# Patient Record
Sex: FEMALE | Race: WHITE | NOT HISPANIC OR LATINO
[De-identification: names, ages, dates, MRNs, and addresses within clinical notes are randomized per-mention and may not be internally consistent; named-entity substitution may affect disease eponyms.]

---

## 2018-08-30 ENCOUNTER — RESULT REVIEW (OUTPATIENT)
Age: 23
End: 2018-08-30

## 2018-09-17 ENCOUNTER — ASOB RESULT (OUTPATIENT)
Age: 23
End: 2018-09-17

## 2018-09-17 ENCOUNTER — APPOINTMENT (OUTPATIENT)
Dept: ANTEPARTUM | Facility: CLINIC | Age: 23
End: 2018-09-17
Payer: COMMERCIAL

## 2018-09-17 PROCEDURE — 76811 OB US DETAILED SNGL FETUS: CPT

## 2018-12-06 ENCOUNTER — APPOINTMENT (OUTPATIENT)
Dept: ANTEPARTUM | Facility: CLINIC | Age: 23
End: 2018-12-06

## 2018-12-13 ENCOUNTER — APPOINTMENT (OUTPATIENT)
Dept: ANTEPARTUM | Facility: CLINIC | Age: 23
End: 2018-12-13
Payer: COMMERCIAL

## 2018-12-13 ENCOUNTER — ASOB RESULT (OUTPATIENT)
Age: 23
End: 2018-12-13

## 2018-12-13 PROCEDURE — 76816 OB US FOLLOW-UP PER FETUS: CPT

## 2019-01-10 ENCOUNTER — OUTPATIENT (OUTPATIENT)
Dept: OUTPATIENT SERVICES | Facility: HOSPITAL | Age: 24
LOS: 1 days | End: 2019-01-10
Payer: COMMERCIAL

## 2019-01-10 DIAGNOSIS — O26.899 OTHER SPECIFIED PREGNANCY RELATED CONDITIONS, UNSPECIFIED TRIMESTER: ICD-10-CM

## 2019-01-10 DIAGNOSIS — Z3A.00 WEEKS OF GESTATION OF PREGNANCY NOT SPECIFIED: ICD-10-CM

## 2019-01-10 PROCEDURE — 59025 FETAL NON-STRESS TEST: CPT | Mod: 26

## 2019-01-12 ENCOUNTER — OUTPATIENT (OUTPATIENT)
Dept: OUTPATIENT SERVICES | Facility: HOSPITAL | Age: 24
LOS: 1 days | End: 2019-01-12
Payer: COMMERCIAL

## 2019-01-12 DIAGNOSIS — O26.899 OTHER SPECIFIED PREGNANCY RELATED CONDITIONS, UNSPECIFIED TRIMESTER: ICD-10-CM

## 2019-01-12 DIAGNOSIS — Z3A.00 WEEKS OF GESTATION OF PREGNANCY NOT SPECIFIED: ICD-10-CM

## 2019-01-12 PROCEDURE — 59025 FETAL NON-STRESS TEST: CPT | Mod: 26

## 2019-01-12 PROCEDURE — 99215 OFFICE O/P EST HI 40 MIN: CPT | Mod: 25

## 2019-01-12 PROCEDURE — 76818 FETAL BIOPHYS PROFILE W/NST: CPT | Mod: 26

## 2019-01-17 ENCOUNTER — INPATIENT (INPATIENT)
Facility: HOSPITAL | Age: 24
LOS: 1 days | Discharge: HOME | End: 2019-01-19
Attending: OBSTETRICS & GYNECOLOGY | Admitting: OBSTETRICS & GYNECOLOGY
Payer: COMMERCIAL

## 2019-01-17 ENCOUNTER — OUTPATIENT (OUTPATIENT)
Dept: OUTPATIENT SERVICES | Facility: HOSPITAL | Age: 24
LOS: 1 days | End: 2019-01-17
Payer: COMMERCIAL

## 2019-01-17 VITALS — WEIGHT: 191.8 LBS | HEIGHT: 62.2 IN

## 2019-01-17 DIAGNOSIS — Z3A.00 WEEKS OF GESTATION OF PREGNANCY NOT SPECIFIED: ICD-10-CM

## 2019-01-17 DIAGNOSIS — O26.899 OTHER SPECIFIED PREGNANCY RELATED CONDITIONS, UNSPECIFIED TRIMESTER: ICD-10-CM

## 2019-01-17 LAB
BASOPHILS # BLD AUTO: 0.04 K/UL — SIGNIFICANT CHANGE UP (ref 0–0.2)
BASOPHILS NFR BLD AUTO: 0.3 % — SIGNIFICANT CHANGE UP (ref 0–2)
BLD GP AB SCN SERPL QL: NEGATIVE — SIGNIFICANT CHANGE UP
EOSINOPHIL # BLD AUTO: 0.01 K/UL — SIGNIFICANT CHANGE UP (ref 0–0.5)
EOSINOPHIL NFR BLD AUTO: 0.1 % — SIGNIFICANT CHANGE UP (ref 0–6)
HCT VFR BLD CALC: 40.2 % — SIGNIFICANT CHANGE UP (ref 34.5–45)
HGB BLD-MCNC: 14 G/DL — SIGNIFICANT CHANGE UP (ref 11.5–15.5)
HIV COMBO RESULT: SIGNIFICANT CHANGE UP
HIV1+2 AB SPEC QL: SIGNIFICANT CHANGE UP
IMM GRANULOCYTES NFR BLD AUTO: 1 % — SIGNIFICANT CHANGE UP (ref 0–1.5)
LYMPHOCYTES # BLD AUTO: 1.68 K/UL — SIGNIFICANT CHANGE UP (ref 1–3.3)
LYMPHOCYTES # BLD AUTO: 12.4 % — LOW (ref 13–44)
MCHC RBC-ENTMCNC: 32.1 PG — SIGNIFICANT CHANGE UP (ref 27–34)
MCHC RBC-ENTMCNC: 34.8 % — SIGNIFICANT CHANGE UP (ref 32–36)
MCV RBC AUTO: 92.2 FL — SIGNIFICANT CHANGE UP (ref 80–100)
MONOCYTES # BLD AUTO: 0.61 K/UL — SIGNIFICANT CHANGE UP (ref 0–0.9)
MONOCYTES NFR BLD AUTO: 4.5 % — SIGNIFICANT CHANGE UP (ref 2–14)
NEUTROPHILS # BLD AUTO: 11.05 K/UL — HIGH (ref 1.8–7.4)
NEUTROPHILS NFR BLD AUTO: 81.7 % — HIGH (ref 43–77)
NRBC # FLD: 0 K/UL — LOW (ref 25–125)
PLATELET # BLD AUTO: 272 K/UL — SIGNIFICANT CHANGE UP (ref 150–400)
PMV BLD: 10.2 FL — SIGNIFICANT CHANGE UP (ref 7–13)
RBC # BLD: 4.36 M/UL — SIGNIFICANT CHANGE UP (ref 3.8–5.2)
RBC # FLD: 12.5 % — SIGNIFICANT CHANGE UP (ref 10.3–14.5)
RH IG SCN BLD-IMP: POSITIVE — SIGNIFICANT CHANGE UP
WBC # BLD: 13.53 K/UL — HIGH (ref 3.8–10.5)
WBC # FLD AUTO: 13.53 K/UL — HIGH (ref 3.8–10.5)

## 2019-01-17 PROCEDURE — 99212 OFFICE O/P EST SF 10 MIN: CPT

## 2019-01-17 PROCEDURE — 59409 OBSTETRICAL CARE: CPT

## 2019-01-17 RX ORDER — SIMETHICONE 80 MG/1
80 TABLET, CHEWABLE ORAL EVERY 6 HOURS
Qty: 0 | Refills: 0 | Status: DISCONTINUED | OUTPATIENT
Start: 2019-01-18 | End: 2019-01-19

## 2019-01-17 RX ORDER — DIPHENHYDRAMINE HCL 50 MG
25 CAPSULE ORAL EVERY 6 HOURS
Qty: 0 | Refills: 0 | Status: DISCONTINUED | OUTPATIENT
Start: 2019-01-18 | End: 2019-01-19

## 2019-01-17 RX ORDER — SODIUM CHLORIDE 9 MG/ML
3 INJECTION INTRAMUSCULAR; INTRAVENOUS; SUBCUTANEOUS EVERY 8 HOURS
Qty: 0 | Refills: 0 | Status: DISCONTINUED | OUTPATIENT
Start: 2019-01-18 | End: 2019-01-19

## 2019-01-17 RX ORDER — PRAMOXINE HYDROCHLORIDE 150 MG/15G
1 AEROSOL, FOAM RECTAL EVERY 4 HOURS
Qty: 0 | Refills: 0 | Status: DISCONTINUED | OUTPATIENT
Start: 2019-01-17 | End: 2019-01-18

## 2019-01-17 RX ORDER — NORETHINDRONE 0.35 MG/1
1 TABLET ORAL
Qty: 30 | Refills: 2 | OUTPATIENT
Start: 2019-01-17 | End: 2019-04-16

## 2019-01-17 RX ORDER — IBUPROFEN 200 MG
600 TABLET ORAL EVERY 6 HOURS
Qty: 0 | Refills: 0 | Status: COMPLETED | OUTPATIENT
Start: 2019-01-17 | End: 2019-12-16

## 2019-01-17 RX ORDER — KETOROLAC TROMETHAMINE 30 MG/ML
30 SYRINGE (ML) INJECTION ONCE
Qty: 0 | Refills: 0 | Status: DISCONTINUED | OUTPATIENT
Start: 2019-01-17 | End: 2019-01-19

## 2019-01-17 RX ORDER — ACETAMINOPHEN 500 MG
975 TABLET ORAL EVERY 6 HOURS
Qty: 0 | Refills: 0 | Status: COMPLETED | OUTPATIENT
Start: 2019-01-17 | End: 2019-12-16

## 2019-01-17 RX ORDER — AER TRAVELER 0.5 G/1
1 SOLUTION RECTAL; TOPICAL EVERY 4 HOURS
Qty: 0 | Refills: 0 | Status: DISCONTINUED | OUTPATIENT
Start: 2019-01-18 | End: 2019-01-19

## 2019-01-17 RX ORDER — OXYTOCIN 10 UNIT/ML
333.33 VIAL (ML) INJECTION
Qty: 20 | Refills: 0 | Status: COMPLETED | OUTPATIENT
Start: 2019-01-17 | End: 2019-01-17

## 2019-01-17 RX ORDER — AER TRAVELER 0.5 G/1
1 SOLUTION RECTAL; TOPICAL EVERY 4 HOURS
Qty: 0 | Refills: 0 | Status: DISCONTINUED | OUTPATIENT
Start: 2019-01-17 | End: 2019-01-18

## 2019-01-17 RX ORDER — SODIUM CHLORIDE 9 MG/ML
1000 INJECTION, SOLUTION INTRAVENOUS
Qty: 0 | Refills: 0 | Status: DISCONTINUED | OUTPATIENT
Start: 2019-01-17 | End: 2019-01-17

## 2019-01-17 RX ORDER — HYDROCORTISONE 1 %
1 OINTMENT (GRAM) TOPICAL EVERY 4 HOURS
Qty: 0 | Refills: 0 | Status: DISCONTINUED | OUTPATIENT
Start: 2019-01-17 | End: 2019-01-18

## 2019-01-17 RX ORDER — OXYTOCIN 10 UNIT/ML
333.33 VIAL (ML) INJECTION
Qty: 20 | Refills: 0 | Status: COMPLETED | OUTPATIENT
Start: 2019-01-17

## 2019-01-17 RX ORDER — LANOLIN
1 OINTMENT (GRAM) TOPICAL EVERY 6 HOURS
Qty: 0 | Refills: 0 | Status: DISCONTINUED | OUTPATIENT
Start: 2019-01-18 | End: 2019-01-19

## 2019-01-17 RX ORDER — GLYCERIN ADULT
1 SUPPOSITORY, RECTAL RECTAL AT BEDTIME
Qty: 0 | Refills: 0 | Status: DISCONTINUED | OUTPATIENT
Start: 2019-01-18 | End: 2019-01-19

## 2019-01-17 RX ORDER — SODIUM CHLORIDE 9 MG/ML
1000 INJECTION, SOLUTION INTRAVENOUS ONCE
Qty: 0 | Refills: 0 | Status: COMPLETED | OUTPATIENT
Start: 2019-01-17 | End: 2019-01-17

## 2019-01-17 RX ORDER — OXYCODONE HYDROCHLORIDE 5 MG/1
5 TABLET ORAL EVERY 4 HOURS
Qty: 0 | Refills: 0 | Status: DISCONTINUED | OUTPATIENT
Start: 2019-01-17 | End: 2019-01-19

## 2019-01-17 RX ORDER — MAGNESIUM HYDROXIDE 400 MG/1
30 TABLET, CHEWABLE ORAL
Qty: 0 | Refills: 0 | Status: DISCONTINUED | OUTPATIENT
Start: 2019-01-18 | End: 2019-01-19

## 2019-01-17 RX ORDER — DIBUCAINE 1 %
1 OINTMENT (GRAM) RECTAL EVERY 4 HOURS
Qty: 0 | Refills: 0 | Status: DISCONTINUED | OUTPATIENT
Start: 2019-01-17 | End: 2019-01-18

## 2019-01-17 RX ORDER — TETANUS TOXOID, REDUCED DIPHTHERIA TOXOID AND ACELLULAR PERTUSSIS VACCINE, ADSORBED 5; 2.5; 8; 8; 2.5 [IU]/.5ML; [IU]/.5ML; UG/.5ML; UG/.5ML; UG/.5ML
0.5 SUSPENSION INTRAMUSCULAR ONCE
Qty: 0 | Refills: 0 | Status: DISCONTINUED | OUTPATIENT
Start: 2019-01-18 | End: 2019-01-19

## 2019-01-17 RX ORDER — OXYTOCIN 10 UNIT/ML
41.67 VIAL (ML) INJECTION
Qty: 20 | Refills: 0 | Status: DISCONTINUED | OUTPATIENT
Start: 2019-01-17 | End: 2019-01-18

## 2019-01-17 RX ORDER — DIBUCAINE 1 %
1 OINTMENT (GRAM) RECTAL EVERY 4 HOURS
Qty: 0 | Refills: 0 | Status: DISCONTINUED | OUTPATIENT
Start: 2019-01-18 | End: 2019-01-19

## 2019-01-17 RX ORDER — SODIUM CHLORIDE 9 MG/ML
3 INJECTION INTRAMUSCULAR; INTRAVENOUS; SUBCUTANEOUS EVERY 8 HOURS
Qty: 0 | Refills: 0 | Status: DISCONTINUED | OUTPATIENT
Start: 2019-01-17 | End: 2019-01-18

## 2019-01-17 RX ORDER — OXYCODONE HYDROCHLORIDE 5 MG/1
5 TABLET ORAL
Qty: 0 | Refills: 0 | Status: DISCONTINUED | OUTPATIENT
Start: 2019-01-17 | End: 2019-01-19

## 2019-01-17 RX ORDER — DOCUSATE SODIUM 100 MG
100 CAPSULE ORAL
Qty: 0 | Refills: 0 | Status: DISCONTINUED | OUTPATIENT
Start: 2019-01-18 | End: 2019-01-19

## 2019-01-17 RX ADMIN — SODIUM CHLORIDE 2000 MILLILITER(S): 9 INJECTION, SOLUTION INTRAVENOUS at 18:36

## 2019-01-17 RX ADMIN — SODIUM CHLORIDE 250 MILLILITER(S): 9 INJECTION, SOLUTION INTRAVENOUS at 17:35

## 2019-01-17 RX ADMIN — Medication 125 MILLIUNIT(S)/MIN: at 22:55

## 2019-01-17 RX ADMIN — Medication 1000 MILLIUNIT(S)/MIN: at 22:55

## 2019-01-17 NOTE — PROVIDER CONTACT NOTE (OTHER) - ASSESSMENT
HR elevated to 120s, BP WNL, shock index 1.1, patient declines dizziness/lightheadedness, bleeding moderate - uterus firm and at umbilicus

## 2019-01-17 NOTE — PROVIDER CONTACT NOTE (OTHER) - BACKGROUND
Patient was slighty tachycardic during labor up to 110s, patient has been afebrile, oral temp of 36.9 at 2240

## 2019-01-18 ENCOUNTER — TRANSCRIPTION ENCOUNTER (OUTPATIENT)
Age: 24
End: 2019-01-18

## 2019-01-18 LAB — T PALLIDUM AB TITR SER: NEGATIVE — SIGNIFICANT CHANGE UP

## 2019-01-18 RX ORDER — IBUPROFEN 200 MG
600 TABLET ORAL EVERY 6 HOURS
Qty: 0 | Refills: 0 | Status: DISCONTINUED | OUTPATIENT
Start: 2019-01-18 | End: 2019-01-19

## 2019-01-18 RX ORDER — HYDROCORTISONE 1 %
1 OINTMENT (GRAM) TOPICAL EVERY 4 HOURS
Qty: 0 | Refills: 0 | Status: DISCONTINUED | OUTPATIENT
Start: 2019-01-18 | End: 2019-01-19

## 2019-01-18 RX ORDER — ACETAMINOPHEN 500 MG
975 TABLET ORAL EVERY 6 HOURS
Qty: 0 | Refills: 0 | Status: DISCONTINUED | OUTPATIENT
Start: 2019-01-18 | End: 2019-01-19

## 2019-01-18 RX ORDER — HYDROCORTISONE 1 %
1 OINTMENT (GRAM) TOPICAL EVERY 4 HOURS
Qty: 0 | Refills: 0 | Status: DISCONTINUED | OUTPATIENT
Start: 2019-01-18 | End: 2019-01-18

## 2019-01-18 RX ORDER — OXYTOCIN 10 UNIT/ML
41.67 VIAL (ML) INJECTION
Qty: 20 | Refills: 0 | Status: DISCONTINUED | OUTPATIENT
Start: 2019-01-18 | End: 2019-01-18

## 2019-01-18 RX ORDER — PRAMOXINE HYDROCHLORIDE 150 MG/15G
1 AEROSOL, FOAM RECTAL EVERY 4 HOURS
Qty: 0 | Refills: 0 | Status: DISCONTINUED | OUTPATIENT
Start: 2019-01-18 | End: 2019-01-19

## 2019-01-18 RX ORDER — PRAMOXINE HYDROCHLORIDE 150 MG/15G
1 AEROSOL, FOAM RECTAL EVERY 4 HOURS
Qty: 0 | Refills: 0 | Status: DISCONTINUED | OUTPATIENT
Start: 2019-01-18 | End: 2019-01-18

## 2019-01-18 RX ADMIN — SODIUM CHLORIDE 3 MILLILITER(S): 9 INJECTION INTRAMUSCULAR; INTRAVENOUS; SUBCUTANEOUS at 14:00

## 2019-01-18 RX ADMIN — Medication 1 TABLET(S): at 11:09

## 2019-01-18 RX ADMIN — SODIUM CHLORIDE 3 MILLILITER(S): 9 INJECTION INTRAMUSCULAR; INTRAVENOUS; SUBCUTANEOUS at 05:47

## 2019-01-18 NOTE — PROGRESS NOTE ADULT - SUBJECTIVE AND OBJECTIVE BOX
Post-op epidural followup:    pt states freely voiding and ambulating.  Denies headache/nausea.  States no concerns at this time.  Vital signs stable.    ICU Vital Signs Last 24 Hrs  T(C): 36.4 (18 Jan 2019 10:18), Max: 36.9 (17 Jan 2019 22:35)  T(F): 97.6 (18 Jan 2019 10:18), Max: 98.4 (17 Jan 2019 22:35)  HR: 106 (18 Jan 2019 10:18) (106 - 115)  BP: 112/79 (18 Jan 2019 10:18) (98/61 - 131/68)  BP(mean): --  ABP: --  ABP(mean): --  RR: 18 (18 Jan 2019 10:18) (16 - 18)  SpO2: 100% (18 Jan 2019 10:18) (98% - 100%)

## 2019-01-18 NOTE — DISCHARGE NOTE OB - ADDITIONAL INSTRUCTIONS
Please call for  follow up  postpartum visit within 4-6  weeks of delivery date,  at Ambulatory Clinic Unit : [facility] :  [facility], 3rd floor : phone # [phone] or walk-in hours are: MONDAY 3-6 pm, WEDNESDAY 3-6 pm, FRIDAY 9-11 am, 1-3 pm

## 2019-01-18 NOTE — DISCHARGE NOTE OB - MEDICATION SUMMARY - MEDICATIONS TO TAKE
I will START or STAY ON the medications listed below when I get home from the hospital:    acetaminophen 325 mg oral tablet  -- 3 tab(s) by mouth every 6 hours  -- Indication: For pain control    ibuprofen 600 mg oral tablet  -- 1 tab(s) by mouth every 6 hours  -- Indication: For pain control    Prenatal Multivitamins with Folic Acid 1 mg oral tablet  -- 1 tab(s) by mouth once a day  -- Indication: For breastfeeding    norethindrone 0.35 mg oral tablet  -- 1 tab(s) by mouth once a day MDD:1  -- Do not take this drug if you are pregnant.  It is very important that you take or use this exactly as directed.  Do not skip doses or discontinue unless directed by your doctor.    -- Indication: For birth control

## 2019-01-18 NOTE — DISCHARGE NOTE OB - PLAN OF CARE
routine recovery Make your follow-up appointment with your doctor in 6 weeks. No heavy lifting, driving, or strenuous activity for 6 weeks. Nothing per vagina such as tampons, intercourse, douches, or tub baths for 6 weeks or until you see your doctor. Call your doctor with any signs and symptoms of infection such as fever, chills, nausea, or vomiting. Call your doctor if you're unable to tolerate food, increase in vaginal bleeding, or have difficulty urinating. Call your doctor if you have pain that is not relieved by your prescribed medications. Notify your doctor with any other concerns.   Call Dr. Hogue' office if you have any of these concerns in the next 6 weeks.

## 2019-01-18 NOTE — DISCHARGE NOTE OB - MEDICATION SUMMARY - MEDICATIONS TO STOP TAKING
I will STOP taking the medications listed below when I get home from the hospital:    Prenatal Vitamins  -- 1 tab(s) by mouth once a day    acetaminophen 325 mg oral tablet  -- 3 tab(s) by mouth every 6 hours    ibuprofen 600 mg oral tablet  -- 1 tab(s) by mouth every 6 hours    dibucaine 1% topical ointment  -- 1 application on skin every 4 hours, As needed, Perineal Discomfort

## 2019-01-18 NOTE — DISCHARGE NOTE OB - HOSPITAL COURSE
Patient had an uncomplicated  followed by an uncomplicated postpartum course. , Hct 40.2 On postpartum day 2, patient was discharged home in stable condition, voiding spontaneously and with normal vital signs.

## 2019-01-18 NOTE — DISCHARGE NOTE OB - MEDICATION SUMMARY - MEDICATIONS TO CHANGE
30-Dec-2017 10:48
I will SWITCH the dose or number of times a day I take the medications listed below when I get home from the hospital:  None

## 2019-01-18 NOTE — PROGRESS NOTE ADULT - PROBLEM SELECTOR PLAN 1
- Pain well controlled, continue current pain regimen  - Increase ambulation  - Continue regular diet    Malini De La Fuente, PGY-1

## 2019-01-18 NOTE — DISCHARGE NOTE OB - MATERIALS PROVIDED
Back To Sleep Handout/Guide to Postpartum Care/  Immunization Record/Shaken Baby Prevention Handout/Breastfeeding Guide and Packet/[facility] Hearing Screen Program/Birth Certificate Instructions/[facility] [place] Screening Program/Breastfeeding Log/Vaccinations/Breastfeeding Mother’s Support Group Information

## 2019-01-18 NOTE — DISCHARGE NOTE OB - CARE PROVIDER_API CALL
Chelsey Hogue (MD), Obstetrics and Gynecology  [address] [place] [address]  Phone: [phone]  Fax: [phone]

## 2019-01-18 NOTE — DISCHARGE NOTE OB - PATIENT PORTAL LINK FT
You can access the [facility], offered by [facility], by registering with the following website: [email or web address]

## 2019-01-18 NOTE — PROGRESS NOTE ADULT - SUBJECTIVE AND OBJECTIVE BOX
OB Postpartum Note - Vaginal Delivery  Patient is 24y  s/p  PP day 1    Subjective:  Patient seen and examined at bedside. No acute overnight events. No acute complaints, pain well controlled. Patient is ambulating, voiding spontaneously, passing gas, and tolerating regular diet. Patient denies SOB, CP, N/V, leg pain. Patient is breast feeding.    Objective:  Vital Signs Last 24 Hours  T(C): 36.8 (19 @ 05:51), Max: 36.9 (19 @ 22:35)  HR: 106 (19 @ 05:51) (106 - 115)  BP: 115/64 (19 @ 05:51) (98/61 - 131/68)  RR: 18 (19 @ 05:51) (16 - 18)  SpO2: 98% (19 @ 05:51) (98% - 99%)    Physical Exam:  General: NAD  Abdomen: Soft, non-tender, non-distended, firm uterine fundus   Pelvic: Lochia wnl  Ext: No edema, No erythema, Non-tender    Labs:  Blood Type: A Positive  Antibody Screen: Negative       RPR: Negative                            14.0   13.53 )-----------( 272      ( 2019 17:30 )             40.2                 MEDICATIONS  (STANDING):  acetaminophen   Tablet .. 975 milliGRAM(s) Oral every 6 hours  diphtheria/tetanus/pertussis (acellular) Vaccine (ADAcel) 0.5 milliLiter(s) IntraMuscular once  ibuprofen  Tablet. 600 milliGRAM(s) Oral every 6 hours  ketorolac   Injectable 30 milliGRAM(s) IV Push once  oxyCODONE    IR 5 milliGRAM(s) Oral every 3 hours  prenatal multivitamin 1 Tablet(s) Oral daily  sodium chloride 0.9% lock flush 3 milliLiter(s) IV Push every 8 hours    MEDICATIONS  (PRN):  dibucaine 1% Ointment 1 Application(s) Topical every 4 hours PRN Perineal Discomfort  diphenhydrAMINE 25 milliGRAM(s) Oral every 6 hours PRN Itching  docusate sodium 100 milliGRAM(s) Oral two times a day PRN Stool Softening  glycerin Suppository - Adult 1 Suppository(s) Rectal at bedtime PRN Constipation  hydrocortisone 1% Cream 1 Application(s) Topical every 4 hours PRN perineal discomfort  lanolin Ointment 1 Application(s) Topical every 6 hours PRN Sore Nipples  magnesium hydroxide Suspension 30 milliLiter(s) Oral two times a day PRN Constipation  oxyCODONE    IR 5 milliGRAM(s) Oral every 4 hours PRN Severe Pain (7 -10)  pramoxine 1%/zinc 5% Cream 1 Application(s) Topical every 4 hours PRN perineal discomfort  simethicone 80 milliGRAM(s) Chew every 6 hours PRN Gas  witch hazel Pads 1 Application(s) Topical every 4 hours PRN Perineal Discomfort

## 2019-01-18 NOTE — DISCHARGE NOTE OB - CARE PLAN
Principal Discharge DX:	Vaginal delivery  Goal:	routine recovery  Assessment and plan of treatment:	Make your follow-up appointment with your doctor in 6 weeks. No heavy lifting, driving, or strenuous activity for 6 weeks. Nothing per vagina such as tampons, intercourse, douches, or tub baths for 6 weeks or until you see your doctor. Call your doctor with any signs and symptoms of infection such as fever, chills, nausea, or vomiting. Call your doctor if you're unable to tolerate food, increase in vaginal bleeding, or have difficulty urinating. Call your doctor if you have pain that is not relieved by your prescribed medications. Notify your doctor with any other concerns.   Call Dr. Hogue' office if you have any of these concerns in the next 6 weeks.

## 2019-01-19 VITALS
DIASTOLIC BLOOD PRESSURE: 78 MMHG | OXYGEN SATURATION: 97 % | HEART RATE: 88 BPM | TEMPERATURE: 98 F | SYSTOLIC BLOOD PRESSURE: 118 MMHG | RESPIRATION RATE: 18 BRPM

## 2019-01-19 RX ORDER — ACETAMINOPHEN 500 MG
3 TABLET ORAL
Qty: 0 | Refills: 0 | DISCHARGE
Start: 2019-01-19

## 2019-01-19 RX ORDER — IBUPROFEN 200 MG
1 TABLET ORAL
Qty: 0 | Refills: 0 | DISCHARGE
Start: 2019-01-19

## 2019-01-19 RX ORDER — NORETHINDRONE 0.35 MG/1
1 TABLET ORAL
Qty: 30 | Refills: 11
Start: 2019-01-19 | End: 2020-01-13

## 2019-01-19 RX ADMIN — Medication 1 TABLET(S): at 12:09

## 2019-01-19 NOTE — PROGRESS NOTE ADULT - PROBLEM SELECTOR PLAN 1
- Pain well controlled, continue current pain regimen  - Increase ambulation, SCDs when not ambulating  - Continue regular diet  - Discharge planning     Zaina Mondragon, PGY1  Pager #[phone]

## 2019-01-19 NOTE — PROGRESS NOTE ADULT - SUBJECTIVE AND OBJECTIVE BOX
OB Progress Note:  PPD#2    S: 25yo  PPD#2 s/p . Patient feels well. Pain is well controlled. She is tolerating a regular diet and passing flatus. She is voiding spontaneously, and ambulating without difficulty. Denies CP/SOB. Denies HA/lightheadedness/dizziness. Denies N/V. Denies calf pain    O:  Vitals:   Vital Signs Last 24 Hrs  T(C): 36.6 (2019 06:43), Max: 36.7 (2019 18:07)  T(F): 97.9 (2019 06:43), Max: 98.1 (2019 18:07)  HR: 88 (2019 06:43) (88 - 106)  BP: 118/78 (2019 06:43) (110/71 - 118/78)  BP(mean): --  RR: 18 (2019 06:43) (17 - 18)  SpO2: 97% (2019 06:43) (97% - 100%)    MEDICATIONS  (STANDING):  acetaminophen   Tablet .. 975 milliGRAM(s) Oral every 6 hours  diphtheria/tetanus/pertussis (acellular) Vaccine (ADAcel) 0.5 milliLiter(s) IntraMuscular once  ibuprofen  Tablet. 600 milliGRAM(s) Oral every 6 hours  ketorolac   Injectable 30 milliGRAM(s) IV Push once  oxyCODONE    IR 5 milliGRAM(s) Oral every 3 hours  prenatal multivitamin 1 Tablet(s) Oral daily  sodium chloride 0.9% lock flush 3 milliLiter(s) IV Push every 8 hours    MEDICATIONS  (PRN):  dibucaine 1% Ointment 1 Application(s) Topical every 4 hours PRN Perineal Discomfort  diphenhydrAMINE 25 milliGRAM(s) Oral every 6 hours PRN Itching  docusate sodium 100 milliGRAM(s) Oral two times a day PRN Stool Softening  glycerin Suppository - Adult 1 Suppository(s) Rectal at bedtime PRN Constipation  hydrocortisone 1% Cream 1 Application(s) Topical every 4 hours PRN perineal discomfort  lanolin Ointment 1 Application(s) Topical every 6 hours PRN Sore Nipples  magnesium hydroxide Suspension 30 milliLiter(s) Oral two times a day PRN Constipation  oxyCODONE    IR 5 milliGRAM(s) Oral every 4 hours PRN Severe Pain (7 -10)  pramoxine 1%/zinc 5% Cream 1 Application(s) Topical every 4 hours PRN perineal discomfort  simethicone 80 milliGRAM(s) Chew every 6 hours PRN Gas  witch hazel Pads 1 Application(s) Topical every 4 hours PRN Perineal Discomfort      Labs:  Blood type: A Positive  Rubella IgG: RPR: Negative                          14.0   13.53<H> >-----------< 272    (  @ 17:30 )             40.2                  Physical Exam:  General: NAD  Abdomen: soft, non-tender, non-distended, fundus firm  Vaginal: lochia wnl, exam deferred  Extremities: No erythema/calf tenderness

## 2021-01-13 ENCOUNTER — APPOINTMENT (OUTPATIENT)
Dept: ANTEPARTUM | Facility: CLINIC | Age: 26
End: 2021-01-13

## 2021-02-23 ENCOUNTER — ASOB RESULT (OUTPATIENT)
Age: 26
End: 2021-02-23

## 2021-02-23 ENCOUNTER — APPOINTMENT (OUTPATIENT)
Dept: ANTEPARTUM | Facility: CLINIC | Age: 26
End: 2021-02-23
Payer: COMMERCIAL

## 2021-02-23 PROCEDURE — 99072 ADDL SUPL MATRL&STAF TM PHE: CPT

## 2021-02-23 PROCEDURE — 76811 OB US DETAILED SNGL FETUS: CPT

## 2021-07-09 ENCOUNTER — TRANSCRIPTION ENCOUNTER (OUTPATIENT)
Age: 26
End: 2021-07-09

## 2021-07-09 ENCOUNTER — INPATIENT (INPATIENT)
Facility: HOSPITAL | Age: 26
LOS: 1 days | Discharge: HOME | End: 2021-07-11
Attending: OBSTETRICS & GYNECOLOGY | Admitting: OBSTETRICS & GYNECOLOGY

## 2021-07-09 VITALS
HEART RATE: 98 BPM | TEMPERATURE: 98 F | RESPIRATION RATE: 15 BRPM | SYSTOLIC BLOOD PRESSURE: 125 MMHG | DIASTOLIC BLOOD PRESSURE: 72 MMHG

## 2021-07-09 DIAGNOSIS — Z3A.00 WEEKS OF GESTATION OF PREGNANCY NOT SPECIFIED: ICD-10-CM

## 2021-07-09 DIAGNOSIS — O42.10 PREMATURE RUPTURE OF MEMBRANES, ONSET OF LABOR MORE THAN 24 HOURS FOLLOWING RUPTURE, UNSPECIFIED WEEKS OF GESTATION: ICD-10-CM

## 2021-07-09 DIAGNOSIS — O26.899 OTHER SPECIFIED PREGNANCY RELATED CONDITIONS, UNSPECIFIED TRIMESTER: ICD-10-CM

## 2021-07-09 LAB
BASOPHILS # BLD AUTO: 0.03 K/UL — SIGNIFICANT CHANGE UP (ref 0–0.2)
BASOPHILS NFR BLD AUTO: 0.3 % — SIGNIFICANT CHANGE UP (ref 0–2)
BLD GP AB SCN SERPL QL: NEGATIVE — SIGNIFICANT CHANGE UP
COVID-19 SPIKE DOMAIN AB INTERP: NEGATIVE — SIGNIFICANT CHANGE UP
COVID-19 SPIKE DOMAIN ANTIBODY RESULT: 0.4 U/ML — SIGNIFICANT CHANGE UP
EOSINOPHIL # BLD AUTO: 0.06 K/UL — SIGNIFICANT CHANGE UP (ref 0–0.5)
EOSINOPHIL NFR BLD AUTO: 0.5 % — SIGNIFICANT CHANGE UP (ref 0–6)
HCT VFR BLD CALC: 37.8 % — SIGNIFICANT CHANGE UP (ref 34.5–45)
HGB BLD-MCNC: 12.3 G/DL — SIGNIFICANT CHANGE UP (ref 11.5–15.5)
IANC: 9.09 K/UL — HIGH (ref 1.5–8.5)
IMM GRANULOCYTES NFR BLD AUTO: 0.6 % — SIGNIFICANT CHANGE UP (ref 0–1.5)
LYMPHOCYTES # BLD AUTO: 1.25 K/UL — SIGNIFICANT CHANGE UP (ref 1–3.3)
LYMPHOCYTES # BLD AUTO: 11.3 % — LOW (ref 13–44)
MCHC RBC-ENTMCNC: 30.1 PG — SIGNIFICANT CHANGE UP (ref 27–34)
MCHC RBC-ENTMCNC: 32.5 GM/DL — SIGNIFICANT CHANGE UP (ref 32–36)
MCV RBC AUTO: 92.4 FL — SIGNIFICANT CHANGE UP (ref 80–100)
MONOCYTES # BLD AUTO: 0.53 K/UL — SIGNIFICANT CHANGE UP (ref 0–0.9)
MONOCYTES NFR BLD AUTO: 4.8 % — SIGNIFICANT CHANGE UP (ref 2–14)
NEUTROPHILS # BLD AUTO: 9.09 K/UL — HIGH (ref 1.8–7.4)
NEUTROPHILS NFR BLD AUTO: 82.5 % — HIGH (ref 43–77)
NRBC # BLD: 0 /100 WBCS — SIGNIFICANT CHANGE UP
NRBC # FLD: 0 K/UL — SIGNIFICANT CHANGE UP
PLATELET # BLD AUTO: 260 K/UL — SIGNIFICANT CHANGE UP (ref 150–400)
RBC # BLD: 4.09 M/UL — SIGNIFICANT CHANGE UP (ref 3.8–5.2)
RBC # FLD: 13.6 % — SIGNIFICANT CHANGE UP (ref 10.3–14.5)
RH IG SCN BLD-IMP: POSITIVE — SIGNIFICANT CHANGE UP
SARS-COV-2 IGG+IGM SERPL QL IA: 0.4 U/ML — SIGNIFICANT CHANGE UP
SARS-COV-2 IGG+IGM SERPL QL IA: NEGATIVE — SIGNIFICANT CHANGE UP
SARS-COV-2 RNA SPEC QL NAA+PROBE: SIGNIFICANT CHANGE UP
WBC # BLD: 11.03 K/UL — HIGH (ref 3.8–10.5)
WBC # FLD AUTO: 11.03 K/UL — HIGH (ref 3.8–10.5)

## 2021-07-09 RX ORDER — OXYTOCIN 10 UNIT/ML
333.33 VIAL (ML) INJECTION
Qty: 20 | Refills: 0 | Status: COMPLETED | OUTPATIENT
Start: 2021-07-09 | End: 2021-07-10

## 2021-07-09 RX ORDER — OXYTOCIN 10 UNIT/ML
333.33 VIAL (ML) INJECTION
Qty: 20 | Refills: 0 | Status: DISCONTINUED | OUTPATIENT
Start: 2021-07-09 | End: 2021-07-09

## 2021-07-09 RX ORDER — SODIUM CHLORIDE 9 MG/ML
1000 INJECTION, SOLUTION INTRAVENOUS
Refills: 0 | Status: DISCONTINUED | OUTPATIENT
Start: 2021-07-09 | End: 2021-07-09

## 2021-07-09 RX ORDER — SODIUM CHLORIDE 9 MG/ML
1000 INJECTION, SOLUTION INTRAVENOUS
Refills: 0 | Status: DISCONTINUED | OUTPATIENT
Start: 2021-07-09 | End: 2021-07-10

## 2021-07-09 RX ADMIN — SODIUM CHLORIDE 125 MILLILITER(S): 9 INJECTION, SOLUTION INTRAVENOUS at 21:33

## 2021-07-09 NOTE — OB PROVIDER TRIAGE NOTE - NSHPPHYSICALEXAM_GEN_ALL_CORE
T(C): 36.9 (07-09-21 @ 15:58), Max: 36.9 (07-09-21 @ 15:58)  HR: 88 (07-09-21 @ 16:01) (88 - 98)  BP: 99/55 (07-09-21 @ 16:01) (99/55 - 125/72)  RR: 15 (07-09-21 @ 15:49) (15 - 15)  SpO2: --    General: A&Ox3, appropriate, NAD  Cardiac: Regular rate and rhythm  Resp: CTAB  Abd: Gravid, soft, nontender  SSE: + pooling, nitrazine positive, ferning positive  SVE: 1.5/50/-3  EFM: 120, moderate variability, +accel, no decel  [place]: none  TAS: vtx

## 2021-07-09 NOTE — OB PROVIDER TRIAGE NOTE - NSOBPROVIDERNOTE_OBGYN_ALL_OB_FT
26y  at 39.6w with rupture of membranes    Admit to L&D  Routine labs  Continuous EFM/toco  IOL with PO cytotec  Epidural PRN    d/w Kelsy NAGY 26y  at 39.6w with evidence rupture of membranes  Prolonged ruptured of membranes since 21 at 9A    Admit to L&D  Routine labs  Continuous EFM/toco  IOL with PO cytotec  Covid swabbed  Consents obtained  Epidural PRN    d/w Kelsy NAGY

## 2021-07-09 NOTE — OB PROVIDER H&P - NSHPPHYSICALEXAM_GEN_ALL_CORE
T(C): 36.9 (07-09-21 @ 15:58), Max: 36.9 (07-09-21 @ 15:58)  HR: 88 (07-09-21 @ 16:01) (88 - 98)  BP: 99/55 (07-09-21 @ 16:01) (99/55 - 125/72)  RR: 15 (07-09-21 @ 15:49) (15 - 15)  SpO2: --    General: A&Ox3, appropriate, NAD  Cardiac: Regular rate and rhythm  Resp: CTAB  Abd: Gravid, soft, nontender  SSE: + pooling, nitrazine positive, ferning positive  SVE: 1.5/50/-3  EFM: 120, moderate variability, +accel, no decel  [place]: none  TAS: vtx

## 2021-07-09 NOTE — OB PROVIDER H&P - ASSESSMENT
26y  at 39.6w with rupture of membranes    Admit to L&D  Routine labs  Continuous EFM/toco  IOL with PO cytotec  Covid swabbed  Consent signed  Epidural PRN    d/w Kelsy NAGY 26y  at 39.6w with prolonged rupture of membranes since 21 at 9A      Admit to L&D  Routine labs  Continuous EFM/toco  IOL with PO cytotec  Covid swabbed  Consent signed  Epidural PRN    d/w Kelsy NAGY

## 2021-07-09 NOTE — OB PROVIDER H&P - PROBLEM SELECTOR PROBLEM 1
Premature rupture of membranes with onset of labor more than 24 hours following rupture, unspecified gestational age

## 2021-07-09 NOTE — OB PROVIDER TRIAGE NOTE - HISTORY OF PRESENT ILLNESS
PNC: MD Kelsy    This is a 25yo  at 39.6w (EDC 7/10/21) presents to triage with c/o leakage of fluid since yesterday (21) at 9AM. Reports 4 episodes of wetness on her underwear since 9AM yesterday, but did not come in to get evaluated at that time since she "was waiting for contractions to start". Appreciates good fetal movement, denies contractions or vaginal bleeding. Denies fevers or chills.  Denies positive covid, covid exposures or, covid s/s.    Uncomplicated AP course.

## 2021-07-09 NOTE — OB PROVIDER TRIAGE NOTE - NS_OBGYNHISTORY_OBGYN_ALL_OB_FT
2016   FT (F) 7#9  2019   FT (F) 7#4  SAB x2 complete    GYN hx: denies hx of ovarian cysts, fibroids, abnl pap, or STI

## 2021-07-09 NOTE — OB PROVIDER LABOR PROGRESS NOTE - ASSESSMENT
Patient examined at bedside due to increased pain  -Pain management: epi  -Continue w toco/FHR  -Expectant management    Discussed w Dr. Lola Xavier, PGY-1 Patient examined at bedside due to increased pain  -Pain management: epi  -Continue w toco/FHR  -Expectant management  -Will alert Dr. Tierney    Discussed w Dr. Diane Xavier, PGY-1

## 2021-07-09 NOTE — OB RN TRIAGE NOTE - CHIEF COMPLAINT QUOTE
7/8 srom @9am 7/8 srom @9am, Pt reports small spot of wetness on underwear x4 times since yesterday morning.

## 2021-07-09 NOTE — OB PROVIDER IHI INDUCTION/AUGMENTATION NOTE - NS_PELVIS_OBGYN_ALL_OB
After Visit Summary   11/12/2018    Karina Monteiro    MRN: [id number]           Patient Information     Date Of Birth          1944        Visit Information        Provider Department      11/12/2018 8:30 AM  [facility]        Today's Diagnoses     Pernicious anemia    -  1    B12 deficiency anemia           Follow-ups after your visit        Your next 10 appointments already scheduled     Dec 10, 2018  8:30 AM CST   Nurse Only with [facility] ([facility])    [address] [place] [address] [phone]            Dec 11, 2018 11:40 AM CST   CT CHEST W/O CONTRAST with UCCT2   [facility] [address] ([facility])    [address] [address] [phone]           How do I prepare for my exam? (Food and drink instructions) No Food and Drink Restrictions.  How do I prepare for my exam? (Other instructions) You do not need to do anything special to prepare for this exam. For a sinus scan: Use your nose spray (nasal decongestant spray) as directed.  What should I wear: Please wear loose clothing, such as a sweat suit or jogging clothes. Avoid snaps, zippers and other metal. We may ask you to undress and put on a hospital gown.  How long does the exam take: Most scans take less than 20 minutes.  What should I bring: Please bring any scans or X-rays taken at other hospitals, if similar tests were done. Also bring a list of your medicines, including vitamins, minerals and over-the-counter drugs. It is safest to leave personal items at home.  Do I need a : No  is needed.  What do I need to tell my doctor? Be sure to tell your doctor: * If you have any allergies. * If there s any chance you are pregnant. * If you are breastfeeding.  What should I do after the exam: No restrictions, You may resume normal activities.  What is this test: A CT  (computed tomography) scan is a series of pictures that allows us to look inside your body. The scanner creates images of the body in cross sections, much like slices of bread. This helps us see any problems more clearly.  Who should I call with questions: If you have any questions, please call the Imaging Department where you will have your exam. Directions, parking instructions, and other information is available on our website, [email or web address].            Dec 11, 2018 12:45 PM CST   (Arrive by 12:30 PM)   Return Visit with [facility] ([facility])    [address]  [address]  [facility] [place] [address] [phone]              Who to contact     If you have questions or need follow up information about today's clinic visit or your schedule please contact [facility] directly at [phone].  Normal or non-critical lab and imaging results will be communicated to you by MyChart, letter or phone within 4 business days after the clinic has received the results. If you do not hear from us within 7 days, please contact the clinic through MyChart or phone. If you have a critical or abnormal lab result, we will notify you by phone as soon as possible.  Submit refill requests through [employer] or call your pharmacy and they will forward the refill request to us. Please allow 3 business days for your refill to be completed.          Additional Information About Your Visit        Care EveryWhere ID     This is your Care EveryWhere ID. This could be used by other organizations to access your [facility] medical records  [id number]         Blood Pressure from Last 3 Encounters:   09/20/18 127/81   09/06/18 144/85   09/04/18 159/85    Weight from Last 3 Encounters:   09/20/18 141 lb 8 oz (64.2 kg)   09/06/18 141 lb 8 oz (64.2 kg)   09/04/18 141 lb 8 oz (64.2 kg)              We Performed the Following     B12 - 1000 MCG      INJECTION INTRAMUSCULAR OR SUB-Q        Primary Care Provider Office Phone # Fax #    Maurice Webber [phone] [address] [facility] [address]        Equal Access to Services     ODELL KENDRICK : Kandi dickey adoniso Soaraali, waaxda luqadaha, qaybta kaalmada adechance, yoel patel laDorisjossy oconnor. So [facility] [phone].    ATENCIÓN: Si habla español, tiene a [place] disposición servicios gratuitos de asistencia lingüística. Llame al [phone].    We comply with applicable federal civil rights laws and [place] laws. We do not discriminate on the basis of race, color, national origin, age, disability, sex, sexual orientation, or gender identity.            Thank you!     Thank you for choosing [facility]  for your care. Our goal is always to provide you with excellent care. Hearing back from our patients is one way we can continue to improve our services. Please take a few minutes to complete the written survey that you may receive in the mail after your visit with us. Thank you!             Your Updated Medication List - Protect others around you: Learn how to safely use, store and throw away your medicines at www.disposemymeds.org.          This list is accurate as of 11/12/18  8:53 AM.  Always use your most recent med list.                   Brand Name Dispense Instructions for use Diagnosis    acetaminophen 325 MG tablet    TYLENOL    100 tablet    Take 3 tablets (975 mg) by mouth every 8 hours    Acute post-operative pain       alendronate 70 MG tablet    FOSAMAX    12 tablet    TAKE ONE TABLET BY MOUTH ONCE WEEKLY AS DIRECTED.    Osteoporosis, unspecified osteoporosis type, unspecified pathological fracture presence       amLODIPine 5 MG tablet    NORVASC    90 tablet    TAKE ONE TABLET BY MOUTH ONCE DAILY    Essential hypertension with goal blood pressure less than 140/90       ascorbic acid 500 MG tablet    VITAMIN C    3 MONTHS    ONE TABLET  DAILY    SOB (shortness of breath), Chest pain, Edema       MIGUEL CONTOUR test strip   Generic drug:  blood glucose monitoring     100 each    USE TO TEST BLOOD SUGARS TWO TIMES A DAY OR AS DIRECTED    Type 2 diabetes mellitus without complication, without long-term current use of insulin (H)       blood glucose monitoring lancets     100 each    1 each 2 times daily Use to test blood sugar 2 times daily or as directed.    Type 2 diabetes mellitus without complication, without long-term current use of insulin (H)       CALCIUM 600 + D 600-200 MG-UNIT Tabs     3 MONTHS    1 tablet daily    SOB (shortness of breath), Chest pain, Edema       cyanocobalamin 1000 MCG/ML injection    VITAMIN B12    1 mL    Inject 1 mL (1,000 mcg) into the muscle every 30 days    Pernicious anemia       furosemide 20 MG tablet    LASIX    90 tablet    Take 1 tablet (20 mg) by mouth every morning    Essential hypertension with goal blood pressure less than 140/90       ketorolac 0.5 % ophthalmic solution    ACULAR          lisinopril 10 MG tablet    PRINIVIL/ZESTRIL    90 tablet    TAKE ONE TABLET BY MOUTH ONCE DAILY    Essential hypertension with goal blood pressure less than 140/90       metFORMIN 500 MG tablet    GLUCOPHAGE    180 tablet    TAKE ONE TABLET BY MOUTH TWICE A DAY WITH BREAKFAST AND DINNER    Type 2 diabetes mellitus without complication, without long-term current use of insulin (H)       metoprolol succinate 50 MG 24 hr tablet    TOPROL-XL    90 tablet    TAKE ONE TABLET BY MOUTH ONCE DAILY    Essential hypertension with goal blood pressure less than 140/90       multivitamin per tablet     30    1 TABLET DAILY        ofloxacin 0.3 % ophthalmic solution    OCUFLOX          omeprazole 40 MG capsule    priLOSEC    60 capsule    Take 1 capsule (40 mg) by mouth 2 times daily Take 30-60 minutes before a meal.    Hematemesis without nausea       prednisoLONE acetate 1 % ophthalmic susp    PRED FORTE          simvastatin 10 MG  tablet    ZOCOR    90 tablet    TAKE ONE TABLET BY MOUTH AT BEDTIME.    Hyperlipidemia LDL goal <100, Type 2 diabetes mellitus without complication, without long-term current use of insulin (H)          Adequate

## 2021-07-09 NOTE — OB PROVIDER H&P - PROBLEM SELECTOR PLAN 1
Admit to L&D  Routine labs  Continuous EFM/toco  IOL with PO cytotec  Covid swabbed  Consent signed  Epidural PRN    d/w Kelsy NAGY

## 2021-07-09 NOTE — OB RN PATIENT PROFILE - NS_ANESTHECONSULT_OBGYN_ALL_OB
DISCHARGE INSTRUCTIONS:      1.  Weight bearing as tolerated with walker or 2 crutches until seen by Jovany Gallegos MD.   2.  May shower with Aquacel dressing, this is waterproof. Leave on until removed by the doctor   3.  Continue Coumadin for total of 30 days.  INR goal of 2.0 to 2.5 First INR tomorrow 11/01/2019 and then INR's to be drawn every Monday and Thursday with dose determined by [facility] Coumadin clinic with results called to their clinic at [phone].  Take 2 mg of coumadin at 6:00 pm daily.  Do not take multivitamins while on Coumadin.   4.  Wear compression stockings all day, ok to remove at night.  May stop wearing in 1 month if no leg swelling.  5.  Continue physical therapy as instructed.  6.  Continue restrictions and precautions as instructed.  7.  Take no over-the-counter anti-inflammatory medications like Advil or ibuprofen while taking Coumadin.  8.  Take your pain medications as instructed and only as needed for pain. Do your best to wean from the narcotic pain medicines as able.   9. Please dispose of unused pain medicines.  The majority of our local police stations are able to collect these unused pain medicines.  10.  It is important to walk every 1-2 hours during the day to maintain good circulation, and deep breathing.   11.  It is important to drink plenty of water to rehydrate after surgery.  Drink 6-8 glasses of water per day, particularly if you are still using the pain medicines.  12. A stool softener is recommended to take while taking narcotics.  If you still have troubles with normal bowel movements please try adding either Miralax or milk of magnesia.  13.  Follow up with Jovany Gallegos MD as scheduled.  Contact our office if you have any postoperative questions or concerns at [phone].  14.  Any medical questions should be directed to your Primary Care Physician  15.  When you are discharged from the hospital you will be provided with several prescriptions.   Prescriptions are usually Escribed to the pharmacy of your choice.  16. The use of narcotic pain medicines around the time of surgery was discussed with Reanna Beth prior to surgery.  The amount of narcotics prescribed will be at the discretion of the Surgeon and his or her Advanced Practice Clinician.  The patient has expressed understanding and is in agreement to the above.  Please be aware that many insurance companies and pharmacies are restricting and in some cases making it difficult to get pain medications covered by your plan with out prior authorization.  Prior authorizations are very time consuming and most often end in denial.  We no longer seek approval.  You may need to pay for your pain medications out of pocket.       It is unacceptable and/or illegal to use other than prescribed, share, give, or sell your prescription pain medications, will contribute to the opioid epidemic, and may cause a death.        Philip Ontiveros PA-C  [facility]  [phone]    Supervising Physician for this date and note.   Jovany Gallegos MD     TOTAL HIP REPLACEMENT DISCHARGE INSTRUCTIONS  We hope that you have been pleased with your care and will continue to do well after discharge.  Here is some information to help you when you return home.    MEDICATIONS:  · See Medication sheet:  Prescriptions given to patient/family  · The most acetaminophen (Tylenol) you can take in 24 hours is 4,000 mg.  Each tablet contains between 325-500 mg of acetaminophen; the dosage is located on the bottle.  Do not take more than a total of 4,000 mg in one day.  You will need to check all the drugs that you are taking to be sure the combination of all of them does not add up to more than 4,000 mg.  · Blood thinners/Anticoagulants:  Coumadin (warfarin)  · Do not take non-steroidal anti-inflammatory medicines (NSAIDS) (ex. Advil (ibuprofen), Aleve (Naproxen), Aspirin) while you are taking your blood thinners.  · Ask your pharmacist  for help in disposing of any needle containers.    BLOOD CLOT PREVENTION:  · Wear elastic hose (white stockings) as instructed.  Remove daily to wash and check for skin irritation.  You can hand wash your hose and let them air dry.  · Avoid long car trips.  If in the car for more than one hour, get out of the car and walk around.    DIET:  · Eat a balanced diet with lots of fruit, vegetables and fiber.  Drink 6 to 8 glasses of water a day to avoid constipation.        ACTIVITY:  · Follow your home exercise program as directed by physical therapy and your doctor.  · Walk with:  Walker  · Weight bear as tolerated  · Balance activity to prevent becoming overtired.  · Check with your doctor about driving, working, bathing, showering, or sexual activity.  · Do not soak in a bathtub until your surgeon has approved it.    TO PREVENT DISOLCATION:  · Do not bend your hip more than 90 degrees.  Avoid sitting in low chairs or stools.  · Use a raised toilet seat or portable commode if your toilet at home will not be high enough.  · Do not cross your legs at any time.  · Place a pillow between your knees when turning in bed.  · Do not cross your ankles.  · You will receive more instructions at your follow up appointment.    COLD/HEAT:  · Use ice as needed for pain and swelling.    · Do not use a heating pad.    SIGNS OF INFECTION:  · Fever of higher than 101 degrees.  · Foul smelling drainage from incision.  · Severe swelling or redness around the incision.  · Increasing pain in the incision.    SIGNS OF A BLOOD CLOT:  · Chest pain, congestion or trouble breathing (if the clot is in your lungs).  · Calf pain or swelling of the legs (if the clot is in your legs).  · Call 911 if you have chest pain or trouble breathing.    SURGICAL SITE CARE:  · Keep the surgical site clean and dry.  Do not apply lotions, creams or ointments.  Change dressing if it becomes soaked with drainage or gets wet.  If a dressing change is needed:  Wash  your hands.  Do not touch incision.  Cover with folded 4X4's (gauze) and tape.  Call your doctor's office if there is a lot of drainage, or if drainage does not stop.    MISCELLANEOUS:  · The new hip may set off metal detectors.  Tell your dentist or doctor about your new hip joint.  Ask your dentist or doctor if you need to take antibiotics before any medical or dental procedures.    CALL YOUR DOCTOR IF:  · You have any questions or concerns.  · You have signs or symptoms of a blood clot, infection, or separation of the incision (edges coming apart).  · Severe pain not relieved by medication, rest, and ice.  · Your surgical leg becomes cold, pale, numb or begins tingling.                N/A

## 2021-07-09 NOTE — OB PROVIDER H&P - HISTORY OF PRESENT ILLNESS
PNC: MD Kelsy    This is a 27yo  at 39.6w (EDC 7/10/21) presents to triage with c/o leakage of fluid since yesterday (21) at 9AM. Reports 4 episodes of wetness on her underwear since 9AM yesterday, but did not come in to get evaluated at that time since she "was waiting for contractions to start". Appreciates good fetal movement, denies contractions or vaginal bleeding. Denies fevers or chills.  Denies positive covid, covid exposures or, covid s/s.    Uncomplicated AP course.

## 2021-07-10 LAB — T PALLIDUM AB TITR SER: NEGATIVE — SIGNIFICANT CHANGE UP

## 2021-07-10 RX ORDER — SODIUM CHLORIDE 9 MG/ML
3 INJECTION INTRAMUSCULAR; INTRAVENOUS; SUBCUTANEOUS EVERY 8 HOURS
Refills: 0 | Status: DISCONTINUED | OUTPATIENT
Start: 2021-07-10 | End: 2021-07-11

## 2021-07-10 RX ORDER — LANOLIN
1 OINTMENT (GRAM) TOPICAL EVERY 6 HOURS
Refills: 0 | Status: DISCONTINUED | OUTPATIENT
Start: 2021-07-10 | End: 2021-07-11

## 2021-07-10 RX ORDER — MAGNESIUM HYDROXIDE 400 MG/1
30 TABLET, CHEWABLE ORAL
Refills: 0 | Status: DISCONTINUED | OUTPATIENT
Start: 2021-07-10 | End: 2021-07-11

## 2021-07-10 RX ORDER — SIMETHICONE 80 MG/1
80 TABLET, CHEWABLE ORAL EVERY 4 HOURS
Refills: 0 | Status: DISCONTINUED | OUTPATIENT
Start: 2021-07-10 | End: 2021-07-11

## 2021-07-10 RX ORDER — TETANUS TOXOID, REDUCED DIPHTHERIA TOXOID AND ACELLULAR PERTUSSIS VACCINE, ADSORBED 5; 2.5; 8; 8; 2.5 [IU]/.5ML; [IU]/.5ML; UG/.5ML; UG/.5ML; UG/.5ML
0.5 SUSPENSION INTRAMUSCULAR ONCE
Refills: 0 | Status: DISCONTINUED | OUTPATIENT
Start: 2021-07-10 | End: 2021-07-11

## 2021-07-10 RX ORDER — OXYCODONE HYDROCHLORIDE 5 MG/1
5 TABLET ORAL ONCE
Refills: 0 | Status: DISCONTINUED | OUTPATIENT
Start: 2021-07-10 | End: 2021-07-11

## 2021-07-10 RX ORDER — OXYTOCIN 10 UNIT/ML
333.33 VIAL (ML) INJECTION
Qty: 20 | Refills: 0 | Status: DISCONTINUED | OUTPATIENT
Start: 2021-07-10 | End: 2021-07-11

## 2021-07-10 RX ORDER — DIPHENHYDRAMINE HCL 50 MG
25 CAPSULE ORAL EVERY 6 HOURS
Refills: 0 | Status: DISCONTINUED | OUTPATIENT
Start: 2021-07-10 | End: 2021-07-11

## 2021-07-10 RX ORDER — HYDROCORTISONE 1 %
1 OINTMENT (GRAM) TOPICAL EVERY 6 HOURS
Refills: 0 | Status: DISCONTINUED | OUTPATIENT
Start: 2021-07-10 | End: 2021-07-11

## 2021-07-10 RX ORDER — IBUPROFEN 200 MG
600 TABLET ORAL EVERY 6 HOURS
Refills: 0 | Status: COMPLETED | OUTPATIENT
Start: 2021-07-10 | End: 2022-06-08

## 2021-07-10 RX ORDER — DIBUCAINE 1 %
1 OINTMENT (GRAM) RECTAL EVERY 6 HOURS
Refills: 0 | Status: DISCONTINUED | OUTPATIENT
Start: 2021-07-10 | End: 2021-07-11

## 2021-07-10 RX ORDER — KETOROLAC TROMETHAMINE 30 MG/ML
30 SYRINGE (ML) INJECTION ONCE
Refills: 0 | Status: DISCONTINUED | OUTPATIENT
Start: 2021-07-10 | End: 2021-07-10

## 2021-07-10 RX ORDER — AER TRAVELER 0.5 G/1
1 SOLUTION RECTAL; TOPICAL EVERY 4 HOURS
Refills: 0 | Status: DISCONTINUED | OUTPATIENT
Start: 2021-07-10 | End: 2021-07-11

## 2021-07-10 RX ORDER — OXYCODONE HYDROCHLORIDE 5 MG/1
5 TABLET ORAL
Refills: 0 | Status: DISCONTINUED | OUTPATIENT
Start: 2021-07-10 | End: 2021-07-11

## 2021-07-10 RX ORDER — IBUPROFEN 200 MG
600 TABLET ORAL EVERY 6 HOURS
Refills: 0 | Status: DISCONTINUED | OUTPATIENT
Start: 2021-07-10 | End: 2021-07-11

## 2021-07-10 RX ORDER — ACETAMINOPHEN 500 MG
975 TABLET ORAL
Refills: 0 | Status: DISCONTINUED | OUTPATIENT
Start: 2021-07-10 | End: 2021-07-11

## 2021-07-10 RX ORDER — PRAMOXINE HYDROCHLORIDE 150 MG/15G
1 AEROSOL, FOAM RECTAL EVERY 4 HOURS
Refills: 0 | Status: DISCONTINUED | OUTPATIENT
Start: 2021-07-10 | End: 2021-07-11

## 2021-07-10 RX ORDER — BENZOCAINE 10 %
1 GEL (GRAM) MUCOUS MEMBRANE EVERY 6 HOURS
Refills: 0 | Status: DISCONTINUED | OUTPATIENT
Start: 2021-07-10 | End: 2021-07-11

## 2021-07-10 RX ADMIN — Medication 1000 MILLIUNIT(S)/MIN: at 02:08

## 2021-07-10 RX ADMIN — SODIUM CHLORIDE 3 MILLILITER(S): 9 INJECTION INTRAMUSCULAR; INTRAVENOUS; SUBCUTANEOUS at 05:49

## 2021-07-10 RX ADMIN — Medication 30 MILLIGRAM(S): at 01:56

## 2021-07-10 RX ADMIN — Medication 30 MILLIGRAM(S): at 02:30

## 2021-07-10 NOTE — OB RN DELIVERY SUMMARY - NS_SEPSISRSKCALC_OBGYN_ALL_OB_FT
GBS status in the 'Prenatal Lab tests/results section' on the OB RN Patient Profile must be documented.   EOS calculated successfully. EOS Risk Factor: 0.5/1000 live births ([facility] national incidence); GA=39w6d; Temp=98.8; ROM=40.05; GBS='Negative'; Antibiotics='No antibiotics or any antibiotics < 2 hrs prior to birth'

## 2021-07-10 NOTE — DISCHARGE NOTE OB - CARE PROVIDER_API CALL
Divina Tierney  OBSTETRICS AND GYNECOLOGY  [address], [address]  [place] [address]  Phone: [phone]  Fax: [phone]  Follow Up Time:

## 2021-07-10 NOTE — DISCHARGE NOTE OB - PATIENT PORTAL LINK FT
You can access the FollowMyHealth Patient Portal offered by [facility] by registering at the following website: [email or web address]. By joining [facility]’s FollowMyHealth portal, you will also be able to view your health information using other applications (apps) compatible with our system.

## 2021-07-10 NOTE — OB PROVIDER DELIVERY SUMMARY - NSSELHIDDEN_OBGYN_ALL_OB_FT
[NS_[email or web address]_ALL_OB_FT:[id number]],[NS_DeliveryAssist1_OBGYN_ALL_OB_FT:[id number]=],[NS_DeliveryAssist2_OBGYN_ALL_OB_FT:[id number]=]

## 2021-07-10 NOTE — DISCHARGE NOTE OB - CARE PLAN
Principal Discharge DX:	Normal delivery at term  Goal:	routine recovery  Assessment and plan of treatment:	routine post partum

## 2021-07-10 NOTE — OB RN DELIVERY SUMMARY - NSSELHIDDEN_OBGYN_ALL_OB_FT
[NS_DeliveryAttending1_OBGYN_ALL_OB_FT:[id number]],[NS_DeliveryAssist1_OBGYN_ALL_OB_FT:[id number]],[NS_DeliveryAssist2_OBGYN_ALL_OB_FT:[id number]=],[NS_DeliveryRN_OBGYN_ALL_OB_FT:[id number]]

## 2021-07-10 NOTE — CHART NOTE - NSCHARTNOTEFT_GEN_A_CORE
Ob Attending    Late entry due to log in issues.  Notified by resident PGY 1 Dr Lola Xavier at 12: 49 Am that patient progressed rapidly from 4 cm to FD and was ready to push.  Pt still in 300 location and was being transferred to a delivery room.  I arrived to labor room at 1: 10 am and pt delivered precipitously with Dr. Barrett service attending.  Pt and baby doing well.  For routine post partum care

## 2021-07-10 NOTE — OB PROVIDER DELIVERY SUMMARY - NSPROVIDERDELIVERYNOTE_OBGYN_ALL_OB_FT
Spontaneous vaginal delivery of liveborn infant from YOAV position. Head, shoulders, and body delivered easily, nuchal x2. Infant was suctioned. No mec. Cord clamped and cut and infant passed to mother/peds. Placenta delivered intact with a 3 vessel cord. Fundal massage was given and uterine fundus was found to be firm. Vaginal exam revealed an intact cervix, vaginal walls, and sulci. Patient had a small periurethral laceration. Excellent hemostasis was noted. Patient was stable and went to recovery. Count was correct x2.

## 2021-07-11 VITALS
SYSTOLIC BLOOD PRESSURE: 126 MMHG | HEART RATE: 94 BPM | OXYGEN SATURATION: 96 % | RESPIRATION RATE: 18 BRPM | DIASTOLIC BLOOD PRESSURE: 69 MMHG | TEMPERATURE: 98 F

## 2021-07-11 RX ADMIN — SODIUM CHLORIDE 3 MILLILITER(S): 9 INJECTION INTRAMUSCULAR; INTRAVENOUS; SUBCUTANEOUS at 05:58

## 2021-07-11 NOTE — PROGRESS NOTE ADULT - SUBJECTIVE AND OBJECTIVE BOX
OB Progress Note:  PPD#1    S: 25yo  PPD#1 s/p . Patient feels well. Pain is well controlled, tolerating regular diet, passing flatus, voiding spontaneously, ambulating without difficulty. Denies heavy vaginal bleeding, CP/SOB, N/V, lightheadedness/dizziness.     O:  Vitals:  Vital Signs Last 24 Hrs  T(C): 36.8 (2021 10:35), Max: 36.9 (10 Jul 2021 18:07)  T(F): 98.2 (2021 10:35), Max: 98.5 (10 Jul 2021 18:07)  HR: 94 (2021 10:35) (79 - 94)  BP: 126/69 (2021 10:35) (109/70 - 126/69)  BP(mean): --  RR: 18 (2021 10:35) (18 - 19)  SpO2: 96% (2021 10:35) (96% - 99%)    MEDICATIONS  (STANDING):  acetaminophen   Tablet .. 975 milliGRAM(s) Oral <User Schedule>  diphtheria/tetanus/pertussis (acellular) Vaccine (ADAcel) 0.5 milliLiter(s) IntraMuscular once  ibuprofen  Tablet. 600 milliGRAM(s) Oral every 6 hours  misoprostol Oral Solution 20 MICROGram(s) Oral every 2 hours  oxytocin Infusion 333.333 milliUNIT(s)/Min (1000 mL/Hr) IV Continuous <Continuous>  prenatal multivitamin 1 Tablet(s) Oral daily  sodium chloride 0.9% lock flush 3 milliLiter(s) IV Push every 8 hours      Labs:  Blood type: A Positive  Rubella IgG: RPR: Negative                          12.3   11.03<H> >-----------< 260    (  @ 16:55 )             37.8                  Physical Exam:  General: NAD  Abdomen: soft, non-tender, non-distended, fundus firm  Vaginal: No heavy vaginal bleeding  Extremities: No erythema/edema

## 2021-07-11 NOTE — PROGRESS NOTE ADULT - ASSESSMENT
A/P: 25yo PPD#1 s/p .  Patient is stable and doing well post-partum.   - Pain well controlled, continue current pain regimen  - Increase ambulation, SCDs when not ambulating  - Continue regular diet    Dipti Denton, PGY1

## 2022-05-10 NOTE — OB RN PATIENT PROFILE - PRO VDRL INFANT
Show Spray Paint Technique Variable?: Yes Consent: The patient's consent was obtained including but not limited to risks of crusting, scabbing, blistering, scarring, darker or lighter pigmentary change, recurrence, incomplete removal and infection. Spray Paint Technique: No Post-Care Instructions: I reviewed with the patient in detail post-care instructions. Patient is to wear sunprotection, and avoid picking at any of the treated lesions. Pt may apply Vaseline to crusted or scabbing areas. Billing Information: Bill by Static Price Price (Use Numbers Only, No Special Characters Or $): 0 Detail Level: Detailed Spray Paint Text: The liquid nitrogen was applied to the skin utilizing a spray paint frosting technique. negative

## 2022-09-19 PROBLEM — Z78.9 OTHER SPECIFIED HEALTH STATUS: Chronic | Status: ACTIVE | Noted: 2021-07-09

## 2022-10-03 ENCOUNTER — APPOINTMENT (OUTPATIENT)
Dept: ANTEPARTUM | Facility: CLINIC | Age: 27
End: 2022-10-03

## 2022-10-03 ENCOUNTER — ASOB RESULT (OUTPATIENT)
Age: 27
End: 2022-10-03

## 2022-10-03 PROCEDURE — 76811 OB US DETAILED SNGL FETUS: CPT

## 2022-12-15 NOTE — PATIENT PROFILE OB - NS PRO DEPRESSION SCREENING Y/N1
Impression: Vitreous degeneration, bilateral: H43.813. Plan: Patient education to All signs/symptoms and risks of retinal detachment and tears. Patient instructed to call office immediately if any symptoms noted. no

## 2022-12-16 ENCOUNTER — APPOINTMENT (OUTPATIENT)
Dept: ANTEPARTUM | Facility: CLINIC | Age: 27
End: 2022-12-16

## 2022-12-16 ENCOUNTER — ASOB RESULT (OUTPATIENT)
Age: 27
End: 2022-12-16

## 2022-12-16 PROCEDURE — 76816 OB US FOLLOW-UP PER FETUS: CPT

## 2023-02-16 ENCOUNTER — TRANSCRIPTION ENCOUNTER (OUTPATIENT)
Age: 28
End: 2023-02-16

## 2023-02-16 ENCOUNTER — INPATIENT (INPATIENT)
Facility: HOSPITAL | Age: 28
LOS: 1 days | Discharge: HOME | End: 2023-02-18
Attending: OBSTETRICS & GYNECOLOGY | Admitting: OBSTETRICS & GYNECOLOGY

## 2023-02-16 VITALS
HEIGHT: 63 IN | RESPIRATION RATE: 17 BRPM | SYSTOLIC BLOOD PRESSURE: 136 MMHG | HEART RATE: 96 BPM | TEMPERATURE: 99 F | DIASTOLIC BLOOD PRESSURE: 83 MMHG | WEIGHT: 195.11 LBS

## 2023-02-16 DIAGNOSIS — O48.0 POST-TERM PREGNANCY: ICD-10-CM

## 2023-02-16 LAB
BASOPHILS # BLD AUTO: 0.05 K/UL — SIGNIFICANT CHANGE UP (ref 0–0.2)
BASOPHILS NFR BLD AUTO: 0.4 % — SIGNIFICANT CHANGE UP (ref 0–2)
BLD GP AB SCN SERPL QL: NEGATIVE — SIGNIFICANT CHANGE UP
COVID-19 SPIKE DOMAIN AB INTERP: NEGATIVE — SIGNIFICANT CHANGE UP
COVID-19 SPIKE DOMAIN ANTIBODY RESULT: 0.4 U/ML — SIGNIFICANT CHANGE UP
EOSINOPHIL # BLD AUTO: 0.03 K/UL — SIGNIFICANT CHANGE UP (ref 0–0.5)
EOSINOPHIL NFR BLD AUTO: 0.3 % — SIGNIFICANT CHANGE UP (ref 0–6)
HCT VFR BLD CALC: 35.2 % — SIGNIFICANT CHANGE UP (ref 34.5–45)
HGB BLD-MCNC: 11.4 G/DL — LOW (ref 11.5–15.5)
IANC: 9.46 K/UL — HIGH (ref 1.8–7.4)
IMM GRANULOCYTES NFR BLD AUTO: 1.1 % — HIGH (ref 0–0.9)
LYMPHOCYTES # BLD AUTO: 1.7 K/UL — SIGNIFICANT CHANGE UP (ref 1–3.3)
LYMPHOCYTES # BLD AUTO: 14.2 % — SIGNIFICANT CHANGE UP (ref 13–44)
MCHC RBC-ENTMCNC: 28.5 PG — SIGNIFICANT CHANGE UP (ref 27–34)
MCHC RBC-ENTMCNC: 32.4 GM/DL — SIGNIFICANT CHANGE UP (ref 32–36)
MCV RBC AUTO: 88 FL — SIGNIFICANT CHANGE UP (ref 80–100)
MONOCYTES # BLD AUTO: 0.62 K/UL — SIGNIFICANT CHANGE UP (ref 0–0.9)
MONOCYTES NFR BLD AUTO: 5.2 % — SIGNIFICANT CHANGE UP (ref 2–14)
NEUTROPHILS # BLD AUTO: 9.46 K/UL — HIGH (ref 1.8–7.4)
NEUTROPHILS NFR BLD AUTO: 78.8 % — HIGH (ref 43–77)
NRBC # BLD: 0 /100 WBCS — SIGNIFICANT CHANGE UP (ref 0–0)
NRBC # FLD: 0 K/UL — SIGNIFICANT CHANGE UP (ref 0–0)
PLATELET # BLD AUTO: 262 K/UL — SIGNIFICANT CHANGE UP (ref 150–400)
RBC # BLD: 4 M/UL — SIGNIFICANT CHANGE UP (ref 3.8–5.2)
RBC # FLD: 13.6 % — SIGNIFICANT CHANGE UP (ref 10.3–14.5)
RH IG SCN BLD-IMP: POSITIVE — SIGNIFICANT CHANGE UP
SARS-COV-2 IGG+IGM SERPL QL IA: 0.4 U/ML — SIGNIFICANT CHANGE UP
SARS-COV-2 IGG+IGM SERPL QL IA: NEGATIVE — SIGNIFICANT CHANGE UP
WBC # BLD: 11.99 K/UL — HIGH (ref 3.8–10.5)
WBC # FLD AUTO: 11.99 K/UL — HIGH (ref 3.8–10.5)

## 2023-02-16 RX ORDER — OXYTOCIN 10 UNIT/ML
333.33 VIAL (ML) INJECTION
Qty: 20 | Refills: 0 | Status: DISCONTINUED | OUTPATIENT
Start: 2023-02-16 | End: 2023-02-18

## 2023-02-16 RX ORDER — SODIUM CHLORIDE 9 MG/ML
1000 INJECTION, SOLUTION INTRAVENOUS
Refills: 0 | Status: DISCONTINUED | OUTPATIENT
Start: 2023-02-16 | End: 2023-02-17

## 2023-02-16 RX ORDER — OXYTOCIN 10 UNIT/ML
4 VIAL (ML) INJECTION
Qty: 30 | Refills: 0 | Status: DISCONTINUED | OUTPATIENT
Start: 2023-02-16 | End: 2023-02-17

## 2023-02-16 RX ORDER — CHLORHEXIDINE GLUCONATE 213 G/1000ML
1 SOLUTION TOPICAL ONCE
Refills: 0 | Status: DISCONTINUED | OUTPATIENT
Start: 2023-02-16 | End: 2023-02-17

## 2023-02-16 RX ORDER — CITRIC ACID/SODIUM CITRATE 300-500 MG
15 SOLUTION, ORAL ORAL EVERY 6 HOURS
Refills: 0 | Status: DISCONTINUED | OUTPATIENT
Start: 2023-02-16 | End: 2023-02-17

## 2023-02-16 RX ADMIN — Medication 4 MILLIUNIT(S)/MIN: at 23:20

## 2023-02-16 RX ADMIN — SODIUM CHLORIDE 125 MILLILITER(S): 9 INJECTION, SOLUTION INTRAVENOUS at 20:36

## 2023-02-16 NOTE — OB PROVIDER H&P - NSLOWPPHRISK_OBGYN_A_OB
No previous uterine incision/Christopher Pregnancy/Less than or equal to 4 previous vaginal births/No known bleeding disorder/No history of postpartum hemorrhage/No other PPH risks indicated

## 2023-02-16 NOTE — OB PROVIDER H&P - ASSESSMENT
A/P: Pt is a 28y  41.0wga admitted for late-term induction     1. Admit to LND. Routine Labs. IVF  2. IOL via vaginal Misoprostol   3. Fetus: Vertex, CEFM  4.   5. GBS pos, for Amp / GBS neg  6. Pain: IV pain meds/epidural PRN  7. PNC   - HbsAg and HIV negative     Jonathan Walsh, PGY-1  Obstetrics and Gynecology    plan per Dr. Rivas  A/P: Pt is a 28y  41.0wga admitted for late-term induction     1. Admit to LND. Routine Labs. IVF  2. IOL via vaginal Misoprostol   3. Fetus: Vertex, CEFM  4. GBS neg  5. Pain: IV pain meds/epidural PRN  6. PNC   - HbsAg and HIV negative     Jonathan Walsh, PGY-1  Obstetrics and Gynecology    plan per Dr. Rivas

## 2023-02-16 NOTE — OB PROVIDER H&P - HISTORY OF PRESENT ILLNESS
29 yo P3 here for IOL at 41 wks.  HPI: Pt is a 28y  G_P_  presenting for X.  FM (+)  LOF (-)  CTX (-)  VB (-)  GBS pos/neg  EFW:    PNC complicated by     OBHx:  GynHx: Denies any gynecologic issues  PMHx: Denies  PSHx: Denies  Med: PNV  All: NKDA  Psych: Denies hx of mental health issues  SH: Denies hx of smoking, drinking, or drug usage during the pregnancy    Vital Signs Last 24 Hrs  T(C): 37.1 (16 Feb 2023 19:45), Max: 37.1 (16 Feb 2023 19:28)  T(F): 98.78 (16 Feb 2023 19:45), Max: 98.8 (16 Feb 2023 19:28)  HR: 96 (16 Feb 2023 19:39) (96 - 96)  BP: 136/83 (16 Feb 2023 19:39) (136/83 - 136/83)  BP(mean): --  RR: 15 (16 Feb 2023 19:45) (15 - 17)  SpO2: --    Parameters below as of 16 Feb 2023 19:28  Patient On (Oxygen Delivery Method): room air        SVE:  FHT:  [place]:  Sono: Vertex       HPI: Pt is a 28y [id number] @ 41wga admitted for late-term induction   FM (+)  LOF (-)  CTX (-)  VB (-)  GBS neg  EFW: 3500g    PNC uncomplicated     OBHx:   - . FT. . 7#9oz. Uncomplicated  - . 3150g. FT. . Uncomplicated  - . FT. . 7#7oz. Uncomplicated   GynHx: Denies any gynecologic issues  PMHx: Denies  PSHx: Denies  Med: PNV  All: NKDA  Psych: Denies hx of mental health issues  SH: Denies hx of smoking, drinking, or drug usage during the pregnancy    Vital Signs Last 24 Hrs  T(C): 37.1 (2023 19:45), Max: 37.1 (2023 19:28)  T(F): 98.78 (2023 19:45), Max: 98.8 (2023 19:28)  HR: 96 (2023 19:39) (96 - 96)  BP: 136/83 (2023 19:39) (136/83 - 136/83)  BP(mean): --  RR: 15 (2023 19:45) (15 - 17)  SpO2: --    Parameters below as of 2023 19:28  Patient On (Oxygen Delivery Method): room air        SVE: 3/60/-3 per Dr. Rivas   Sono: Vertex per Dr. Rivas

## 2023-02-17 LAB — T PALLIDUM AB TITR SER: NEGATIVE — SIGNIFICANT CHANGE UP

## 2023-02-17 RX ORDER — DIPHENHYDRAMINE HCL 50 MG
25 CAPSULE ORAL EVERY 6 HOURS
Refills: 0 | Status: DISCONTINUED | OUTPATIENT
Start: 2023-02-17 | End: 2023-02-18

## 2023-02-17 RX ORDER — SODIUM CHLORIDE 9 MG/ML
3 INJECTION INTRAMUSCULAR; INTRAVENOUS; SUBCUTANEOUS EVERY 8 HOURS
Refills: 0 | Status: DISCONTINUED | OUTPATIENT
Start: 2023-02-17 | End: 2023-02-18

## 2023-02-17 RX ORDER — AER TRAVELER 0.5 G/1
1 SOLUTION RECTAL; TOPICAL EVERY 4 HOURS
Refills: 0 | Status: DISCONTINUED | OUTPATIENT
Start: 2023-02-17 | End: 2023-02-18

## 2023-02-17 RX ORDER — OXYCODONE HYDROCHLORIDE 5 MG/1
5 TABLET ORAL ONCE
Refills: 0 | Status: DISCONTINUED | OUTPATIENT
Start: 2023-02-17 | End: 2023-02-18

## 2023-02-17 RX ORDER — LANOLIN
1 OINTMENT (GRAM) TOPICAL EVERY 6 HOURS
Refills: 0 | Status: DISCONTINUED | OUTPATIENT
Start: 2023-02-17 | End: 2023-02-18

## 2023-02-17 RX ORDER — OXYCODONE HYDROCHLORIDE 5 MG/1
5 TABLET ORAL
Refills: 0 | Status: DISCONTINUED | OUTPATIENT
Start: 2023-02-17 | End: 2023-02-18

## 2023-02-17 RX ORDER — KETOROLAC TROMETHAMINE 30 MG/ML
30 SYRINGE (ML) INJECTION ONCE
Refills: 0 | Status: DISCONTINUED | OUTPATIENT
Start: 2023-02-17 | End: 2023-02-18

## 2023-02-17 RX ORDER — TETANUS TOXOID, REDUCED DIPHTHERIA TOXOID AND ACELLULAR PERTUSSIS VACCINE, ADSORBED 5; 2.5; 8; 8; 2.5 [IU]/.5ML; [IU]/.5ML; UG/.5ML; UG/.5ML; UG/.5ML
0.5 SUSPENSION INTRAMUSCULAR ONCE
Refills: 0 | Status: DISCONTINUED | OUTPATIENT
Start: 2023-02-17 | End: 2023-02-18

## 2023-02-17 RX ORDER — DIBUCAINE 1 %
1 OINTMENT (GRAM) RECTAL EVERY 6 HOURS
Refills: 0 | Status: DISCONTINUED | OUTPATIENT
Start: 2023-02-17 | End: 2023-02-18

## 2023-02-17 RX ORDER — MAGNESIUM HYDROXIDE 400 MG/1
30 TABLET, CHEWABLE ORAL
Refills: 0 | Status: DISCONTINUED | OUTPATIENT
Start: 2023-02-17 | End: 2023-02-18

## 2023-02-17 RX ORDER — PRAMOXINE HYDROCHLORIDE 150 MG/15G
1 AEROSOL, FOAM RECTAL EVERY 4 HOURS
Refills: 0 | Status: DISCONTINUED | OUTPATIENT
Start: 2023-02-17 | End: 2023-02-18

## 2023-02-17 RX ORDER — ACETAMINOPHEN 500 MG
975 TABLET ORAL
Refills: 0 | Status: DISCONTINUED | OUTPATIENT
Start: 2023-02-17 | End: 2023-02-18

## 2023-02-17 RX ORDER — SIMETHICONE 80 MG/1
80 TABLET, CHEWABLE ORAL EVERY 4 HOURS
Refills: 0 | Status: DISCONTINUED | OUTPATIENT
Start: 2023-02-17 | End: 2023-02-18

## 2023-02-17 RX ORDER — IBUPROFEN 200 MG
600 TABLET ORAL EVERY 6 HOURS
Refills: 0 | Status: DISCONTINUED | OUTPATIENT
Start: 2023-02-17 | End: 2023-02-18

## 2023-02-17 RX ORDER — IBUPROFEN 200 MG
600 TABLET ORAL EVERY 6 HOURS
Refills: 0 | Status: COMPLETED | OUTPATIENT
Start: 2023-02-17 | End: 2024-01-16

## 2023-02-17 RX ORDER — OXYTOCIN 10 UNIT/ML
41.67 VIAL (ML) INJECTION
Qty: 20 | Refills: 0 | Status: DISCONTINUED | OUTPATIENT
Start: 2023-02-17 | End: 2023-02-18

## 2023-02-17 RX ORDER — HYDROCORTISONE 1 %
1 OINTMENT (GRAM) TOPICAL EVERY 6 HOURS
Refills: 0 | Status: DISCONTINUED | OUTPATIENT
Start: 2023-02-17 | End: 2023-02-18

## 2023-02-17 RX ORDER — BENZOCAINE 10 %
1 GEL (GRAM) MUCOUS MEMBRANE EVERY 6 HOURS
Refills: 0 | Status: DISCONTINUED | OUTPATIENT
Start: 2023-02-17 | End: 2023-02-18

## 2023-02-17 RX ADMIN — SODIUM CHLORIDE 3 MILLILITER(S): 9 INJECTION INTRAMUSCULAR; INTRAVENOUS; SUBCUTANEOUS at 14:32

## 2023-02-17 NOTE — OB NEONATOLOGY/PEDIATRICIAN DELIVERY SUMMARY - NSPEDSNEONOTESA_OBGYN_ALL_OB_FT
Pediatrician called to delivery for fetal bradycardia. Male infant born at 41+1wks via  to a 27y/o  blood type A+ mother. No significant maternal or prenatal history. Prenatal labs nr/immune/-, GBS - on , COVID neg. AROM at 01:59 on  with heavy meconium fluids. Baby emerged vigorous, crying. Cord clamping delayed 60sec. Infant was brought to radiant warmer and warmed, dried, stimulated and suctioned. HR>100, normal respiratory effort. APGARS of 9/9. Mom is initiating breast feeding. Defers Hepatitis B vaccination. Declines for infant to be circumcised. EOS score 0.13. Pediatrician is Salvador Burt.   Baby stable for transfer to  nursery. Parents updated.

## 2023-02-17 NOTE — OB RN DELIVERY SUMMARY - NSSELHIDDEN_OBGYN_ALL_OB_FT
[NS_DeliveryAttending1_OBGYN_ALL_OB_FT:[id number]],[NS_DeliveryAssist1_OBGYN_ALL_OB_FT:[id number]],[NS_DeliveryRN_OBGYN_ALL_OB_FT:[id number]=]

## 2023-02-17 NOTE — OB RN DELIVERY SUMMARY - BABY A: APGAR 1 MIN MUSCLE TONE, DELIVERY
What Type Of Note Output Would You Prefer (Optional)?: Standard Output What Is The Reason For Today's Visit?: Full Body Skin Examination What Is The Reason For Today's Visit? (Being Monitored For X): concerning skin lesions on an annual basis How Severe Are Your Spot(S)?: mild (2) well flexed

## 2023-02-17 NOTE — OB PROVIDER DELIVERY SUMMARY - NSSELHIDDEN_OBGYN_ALL_OB_FT
[NS_DeliveryAttending1_OBGYN_ALL_OB_FT:[id number]],[NS_DeliveryAssist1_OBGYN_ALL_OB_FT:[id number]]

## 2023-02-17 NOTE — DISCHARGE NOTE OB - MEDICATION SUMMARY - MEDICATIONS TO TAKE
I will START or STAY ON the medications listed below when I get home from the hospital:  None I will START or STAY ON the medications listed below when I get home from the hospital:    acetaminophen 325 mg oral tablet  -- 3 tab(s) by mouth every 6 hours  -- Indication: For Pain    ibuprofen 600 mg oral tablet  -- 1 tab(s) by mouth every 6 hours  -- Indication: For Pain

## 2023-02-17 NOTE — DISCHARGE NOTE OB - PATIENT PORTAL LINK FT
You can access the FollowMyHealth Patient Portal offered by [facility] by registering at the following website: [email or web address]. By joining [employer]’s FollowMyHealth portal, you will also be able to view your health information using other applications (apps) compatible with our system.

## 2023-02-17 NOTE — DISCHARGE NOTE OB - CARE PROVIDER_API CALL
Zonia Rivas)  Obstetrics and Gynecology  [address], [address]  [place] [address]  Phone: [phone]  Fax: [phone]  Follow Up Time:

## 2023-02-17 NOTE — OB PROVIDER DELIVERY SUMMARY - NSPROVIDERDELIVERYNOTE_OBGYN_ALL_OB_FT
Patient fully dilated and pushing.  of viable male infant in OA position.  Head, shoulders and body delivered easily.  + nuchal cord noted.  Cord clamped and cut after 1 minute.  Nose and mouth bulb suctioned.  Infant handed to awaiting mother.  Placenta delivered spontaneously intact.  Good hemostasis noted.  Lap counts correct.  Mother and baby in stable condition.

## 2023-02-17 NOTE — OB RN DELIVERY SUMMARY - NS_SEPSISRSKCALC_OBGYN_ALL_OB_FT
EOS calculated successfully. EOS Risk Factor: 0.5/1000 live births ([facility] national incidence); GA=41w1d; Temp=98.8; ROM=2.383; GBS='Negative'; Antibiotics='No antibiotics or any antibiotics < 2 hrs prior to birth'

## 2023-02-17 NOTE — DISCHARGE NOTE OB - CARE PLAN
Principal Discharge DX:	Vaginal delivery  Assessment and plan of treatment:	Follow up in office in 6 weeks for postpartum visit.   1

## 2023-02-18 VITALS
SYSTOLIC BLOOD PRESSURE: 124 MMHG | RESPIRATION RATE: 17 BRPM | TEMPERATURE: 98 F | OXYGEN SATURATION: 98 % | HEART RATE: 98 BPM | DIASTOLIC BLOOD PRESSURE: 75 MMHG

## 2023-02-18 RX ORDER — ACETAMINOPHEN 500 MG
3 TABLET ORAL
Qty: 0 | Refills: 0 | DISCHARGE
Start: 2023-02-18

## 2023-02-18 RX ORDER — IBUPROFEN 200 MG
1 TABLET ORAL
Qty: 0 | Refills: 0 | DISCHARGE
Start: 2023-02-18

## 2023-02-18 NOTE — PROGRESS NOTE ADULT - SUBJECTIVE AND OBJECTIVE BOX
PPD#1- ATTENDING NOTE    S: Patient doing well. Minimal lochia. Pain controlled.    O: Vital Signs Last 24 Hrs  T(C): 36.3 (18 Feb 2023 06:30), Max: 36.9 (17 Feb 2023 10:07)  T(F): 97.3 (18 Feb 2023 06:30), Max: 98.4 (17 Feb 2023 10:07)  HR: 83 (18 Feb 2023 06:30) (59 - 92)  BP: 120/69 (18 Feb 2023 06:30) (110/74 - 133/81)  BP(mean): --  RR: 18 (18 Feb 2023 06:30) (16 - 18)  SpO2: 98% (18 Feb 2023 06:30) (98% - 100%)    Parameters below as of 18 Feb 2023 06:30  Patient On (Oxygen Delivery Method): room air        Gen: NAD  Abd: soft, NT, ND, fundus firm below umbilicus  Lochia: moderate  Ext: no tenderness, no hyper reflexia    Labs:                        11.4   11.99 )-----------( 262      ( 16 Feb 2023 19:50 )             35.2

## 2024-07-18 ENCOUNTER — RESULT REVIEW (OUTPATIENT)
Age: 29
End: 2024-07-18

## 2025-03-06 NOTE — OB PROVIDER DELIVERY SUMMARY - OB VTE ASSESSMENT
HCC coding opportunities       Chart reviewed, no opportunity found: CHART REVIEWED, NO OPPORTUNITY FOUND        Patients Insurance     Medicare Insurance: Medicare           <<--- Click to launch